# Patient Record
Sex: MALE | URBAN - METROPOLITAN AREA
[De-identification: names, ages, dates, MRNs, and addresses within clinical notes are randomized per-mention and may not be internally consistent; named-entity substitution may affect disease eponyms.]

---

## 2019-08-29 ENCOUNTER — APPOINTMENT (RX ONLY)
Age: 54
Setting detail: DERMATOLOGY
End: 2019-08-29

## 2019-08-29 DIAGNOSIS — L40.0 PSORIASIS VULGARIS: ICD-10-CM

## 2019-08-29 PROBLEM — M12.9 ARTHROPATHY, UNSPECIFIED: Status: ACTIVE | Noted: 2019-08-29

## 2019-08-29 PROCEDURE — ? COUNSELING

## 2019-08-29 PROCEDURE — 99202 OFFICE O/P NEW SF 15 MIN: CPT

## 2019-08-29 PROCEDURE — ? PRESCRIPTION

## 2019-08-29 RX ORDER — CALCIPOTRIENE 50 UG/G
CREAM TOPICAL
Qty: 1 | Refills: 11 | Status: ERX | COMMUNITY
Start: 2019-08-29

## 2019-08-29 RX ADMIN — CALCIPOTRIENE: 50 CREAM TOPICAL at 15:36

## 2019-08-29 NOTE — HPI: RASH (PSORIASIS)
How Severe Is Your Psoriasis?: moderate
Is This A New Presentation, Or A Follow-Up?: Psoriasis
Additional History: Patient states that recently he has been getting new plaques on his face and no where else. Patient does use the desonide on and off but does not feel like it is improving his plaques. Patient wants to talk about Humira and the side effects of it, as well as the benefits. Patient wants to talk about other possible psoriasis medication.

## 2019-08-29 NOTE — PROCEDURE: COUNSELING
Patient Specific Counseling (Will Not Stick From Patient To Patient): After discussing in length various treatments, the patient decided to begin the process of obtaining otezla. I reviewed the benefits and risks in length. In addition, I counseled the patient to begin to take a Vitamin D supplement, apply a Vitamin D topical cream, take a turmeric supplement, and moisturize his skin. I instructed the patient to alternate between Dovonex and Desonide. The patient will contact the clinic if he requires a refill. We will follow up in 2 months.
Detail Level: Simple

## 2019-09-02 RX ORDER — APREMILAST 10-20-30MG
KIT ORAL
Qty: 1 | Refills: 0 | Status: ACTIVE